# Patient Record
Sex: FEMALE | Race: WHITE | Employment: FULL TIME | URBAN - METROPOLITAN AREA
[De-identification: names, ages, dates, MRNs, and addresses within clinical notes are randomized per-mention and may not be internally consistent; named-entity substitution may affect disease eponyms.]

---

## 2017-08-22 ENCOUNTER — OFFICE VISIT (OUTPATIENT)
Dept: OBGYN CLINIC | Age: 32
End: 2017-08-22

## 2017-08-22 VITALS
SYSTOLIC BLOOD PRESSURE: 101 MMHG | HEIGHT: 62 IN | DIASTOLIC BLOOD PRESSURE: 60 MMHG | HEART RATE: 79 BPM | BODY MASS INDEX: 23.37 KG/M2 | WEIGHT: 127 LBS

## 2017-08-22 DIAGNOSIS — E28.2 PCOS (POLYCYSTIC OVARIAN SYNDROME): ICD-10-CM

## 2017-08-22 DIAGNOSIS — Z01.419 ENCOUNTER FOR WELL WOMAN EXAM WITH ROUTINE GYNECOLOGICAL EXAM: Primary | ICD-10-CM

## 2017-08-22 RX ORDER — OXYBUTYNIN CHLORIDE 5 MG/1
TABLET, EXTENDED RELEASE ORAL
Refills: 2 | COMMUNITY
Start: 2017-07-03 | End: 2022-08-08

## 2017-08-22 NOTE — PROGRESS NOTES
Annual exam ages 21-46    [address] is a ,  29 y.o. female [facility] Patient's last menstrual period was 2017., who presents for her annual checkup. Since her last annual GYN exam about one year ago on 16, she has had the following changes in her health history: Overactive bladder, on Rx - oxybutin. PCOS. Sees endocrinologist in [place]. On Metformin 500mg BID. ADDITIONAL CONCERNS:  She is having no significant problems. With regard to the Gardasil vaccine, she is older than the FDA approved age to receive it. Menstrual status:    Her periods are light in flow. She is using one to two pads or tampons per day, usually regular every 5-6 weeks. She denies dysmenorrhea. She denies premenstrual symptoms. Contraception:    The current method of family planning is condoms most of the time. Sexual history:     She  reports that she currently engages in sexual activity and has had male partners. She reports using the following method of birth control/protection: Condom. .        Pap and Mammogram History:    Her most recent Pap smear was normal, HPV was not indicated, obtained 2 year(s) ago. She does not have a history of abnormal paps. The patient has never had a mammogram.    Breast Cancer History/Substance Abuse: Negative     Past Medical History:   Diagnosis Date    HPV vaccine counseling     Gardasil series completed    Overactive bladder     Sx resolved, off meds    Screening for malignant neoplasm of the cervix 7/14/15    Negative (no hpv)     History reviewed. No pertinent surgical history.   OB History    Para Term  AB Living   0        SAB TAB Ectopic Molar Multiple Live Births                     Current Outpatient Prescriptions   Medication Sig Dispense Refill    metFORMIN (GLUCOPHAGE) 500 mg tablet   5    oxybutynin chloride XL (DITROPAN XL) 5 mg CR tablet TK 1 T PO QHS  2    Biotin 2,500 mcg cap Take  by mouth.      multivitamin (ONE A DAY) tablet Take 1 Tab by mouth daily.  MYRBETRIQ 50 mg ER tablet   3    progesterone (PROMETRIUM) 200 mg capsule 20omg qhs x10n 10 Cap 0    B COMPLEX WITH VITAMIN C (MYBEC PO) Take  by mouth.  HYDROcodone-acetaminophen (VICODIN) 5-500 mg per tablet Take 1 Tab by mouth every six (6) hours as needed for Pain. 20 Tab 0     Allergies: Cefzil [cefprozil] and Erythromycin   Social History     Social History    Marital status:      Spouse name: N/A    Number of children: N/A    Years of education: N/A     Occupational History    Not on file. Social History Main Topics    Smoking status: Never Smoker    Smokeless tobacco: Never Used      Comment: Never used vapor or e-cigs     Alcohol use No    Drug use: No    Sexual activity: Yes     Partners: Male     Birth control/ protection: Condom     Other Topics Concern    Not on file     Social History Narrative     Tobacco History:  reports that she has never smoked. She has never used smokeless tobacco.  Alcohol Abuse:  reports that she does not drink alcohol. Drug Abuse:  reports that she does not use illicit drugs. There is no problem list on file for this patient.     Family History   Problem Relation Age of Onset    Hypertension Mother     Diabetes Father     Hypertension Brother     Diabetes Paternal Grandmother        Review of Systems - History obtained from the patient  Constitutional: negative for weight loss, fever, night sweats  HEENT: negative for hearing loss, earache, congestion, snoring, sorethroat  CV: negative for chest pain, palpitations, edema  Resp: negative for cough, shortness of breath, wheezing  GI: negative for change in bowel habits, abdominal pain, black or bloody stools  : negative for  vaginal discharge  MSK: negative for back pain, joint pain, muscle pain  Breast: negative for breast lumps, nipple discharge, galactorrhea  Skin :negative for itching, rash, hives  Neuro: negative for dizziness, headache, confusion, weakness  Psych: negative for anxiety, depression, change in mood  Heme/lymph: negative for bleeding, bruising, pallor    Physical Exam    Visit Vitals    /60    Pulse 79    Ht 5' 1.5\" (1.562 m)    Wt 127 lb (57.6 kg)    LMP 08/17/2017    BMI 23.61 kg/m2       Constitutional  · Appearance: well-nourished, well developed, alert, in no acute distress    HENT  · Head and Face: appears normal    Neck  · Inspection/Palpation: normal appearance, no masses or tenderness  · Lymph Nodes: no lymphadenopathy present  · Thyroid: gland size normal, nontender, no nodules or masses present on palpation    Chest  · Respiratory Effort: breathing unlabored  · Auscultation: normal breath sounds    Cardiovascular  · Heart:  · Auscultation: regular rate and rhythm without murmur    Breasts  · Inspection of Breasts: breasts symmetrical, no skin changes, no discharge present, nipple appearance normal, no skin retraction present  · Palpation of Breasts and Axillae: no masses present on palpation, no breast tenderness  · Axillary Lymph Nodes: no lymphadenopathy present    Gastrointestinal  · Abdominal Examination: abdomen non-tender to palpation, normal bowel sounds, no masses present  · Liver and spleen: no hepatomegaly present, spleen not palpable  · Hernias: no hernias identified    Genitourinary  · External Genitalia: normal appearance for age, no discharge present, no tenderness present, no inflammatory lesions present, no masses present, no atrophy present  · Vagina: normal vaginal vault without central or paravaginal defects, no discharge present, no inflammatory lesions present, no masses present  · Bladder: non-tender to palpation  · Urethra: appears normal  · Cervix: normal   · Uterus: normal size, shape and consistency  · Adnexa: no adnexal tenderness present, no adnexal masses present  · Perineum: perineum within normal limits, no evidence of trauma, no rashes or skin lesions present  · Anus: anus within normal limits, no hemorrhoids present  · Inguinal Lymph Nodes: no lymphadenopathy present    Skin  · General Inspection: no rash, no lesions identified    Neurologic/Psychiatric  · Mental Status:  · Orientation: grossly oriented to person, place and time  · Mood and Affect: mood normal, affect appropriate        Assessment & Plan:  · Routine gynecologic examination. Pap/HPV  · Preconceptual counseling. Rec MVI/PNV/folate/DHA. Healthy life style, avoid T/E/D. Up to date with vaccinations. Address any dental issues prior to pregnancy. Handouts given. · PCOS. Followed by endo. On Metformin. · OAB. On Oxybutin. · Her current medical status is satisfactory with no evidence of significant gynecologic issues.   · Counseled re: diet, exercise, healthy lifestyle  · Return for yearly wellness visits  · Patient verbalized understanding

## 2017-08-26 LAB
CYTOLOGIST CVX/VAG CYTO: NORMAL
CYTOLOGY CVX/VAG DOC THIN PREP: NORMAL
CYTOLOGY HISTORY:: NORMAL
DX ICD CODE: NORMAL
HPV I/H RISK 1 DNA CVX QL PROBE+SIG AMP: NEGATIVE
Lab: NORMAL
OTHER STN SPEC: NORMAL
PATH REPORT.FINAL DX SPEC: NORMAL
STAT OF ADQ CVX/VAG CYTO-IMP: NORMAL

## 2019-07-15 ENCOUNTER — OFFICE VISIT (OUTPATIENT)
Dept: OBGYN CLINIC | Age: 34
End: 2019-07-15

## 2019-07-15 VITALS
BODY MASS INDEX: 25.76 KG/M2 | WEIGHT: 140 LBS | DIASTOLIC BLOOD PRESSURE: 66 MMHG | HEART RATE: 82 BPM | HEIGHT: 62 IN | SYSTOLIC BLOOD PRESSURE: 101 MMHG

## 2019-07-15 DIAGNOSIS — Z01.419 ENCOUNTER FOR WELL WOMAN EXAM WITH ROUTINE GYNECOLOGICAL EXAM: ICD-10-CM

## 2019-07-15 DIAGNOSIS — E28.2 PCOS (POLYCYSTIC OVARIAN SYNDROME): ICD-10-CM

## 2019-07-15 DIAGNOSIS — N94.6 DYSMENORRHEA: Primary | ICD-10-CM

## 2019-07-15 NOTE — PROGRESS NOTES
Annual exam ages 21-44    [address] is a [de-identified] P5,  30 y.o. female Formerly named [facility] Patient's last menstrual period was 2019 (approximate). , who presents for her annual checkup. LV=17 for AE     in [place]    Since her last annual GYN exam about two years ago on 17, she has had the following changes in her health history: none    - overactive bladder. Has been on oxybutynin. - PCOS. Followed by joshua in [facility], on Metformin. Started PNV after last AE. ADDITIONAL CONCERNS:  She is having no significant problems. With regard to the Gardasil vaccine, she has received all 3 injections. Menstrual status:    Her periods are moderate in flow, lasting 6-7 days, varies d/t PCOS, cycles every 4-6 wks. Since April/May have been every 4wks. She has dysmenorrhea. Bad cramps with cycles. Started in April. Has taken Midol that didn't help. Has taken NSAIDs with some relief. She has some premenstrual symptoms - breast tenderness, smell sensitivity    Contraception:    The current method of family planning is none. Sexual history:     She  reports that she currently engages in sexual activity and has had partners who are Male. She reports using the following method of birth control/protection: None. Pap and Mammogram History:    Her most recent Pap smear was normal, HPV was negative, obtained on 17. She does not have a history of abnormal paps. The patient has never had a mammogram.    Breast Cancer History/Substance Abuse: Negative     Past Medical History:   Diagnosis Date    HPV vaccine counseling     Gardasil series completed    Overactive bladder     PCOS (polycystic ovarian syndrome)     Sees joshua in [facility]. Metformin 500mg BID.  Screening for malignant neoplasm of the cervix 2017    Negative ; HPV Negative      History reviewed. No pertinent surgical history.   OB History    Para Term  AB Living   0 SAB TAB Ectopic Molar Multiple Live Births                     Current Outpatient Medications   Medication Sig Dispense Refill    oxybutynin chloride XL (DITROPAN XL) 5 mg CR tablet TK 1 T PO QHS  2    metFORMIN (GLUCOPHAGE) 500 mg tablet   5    multivitamin (ONE A DAY) tablet Take 1 Tab by mouth daily. Allergies: Cefzil [cefprozil] and Erythromycin   Social History     Socioeconomic History    Marital status:      Spouse name: Not on file    Number of children: Not on file    Years of education: Not on file    Highest education level: Not on file   Occupational History    Not on file   Social Needs    Financial resource strain: Not on file    Food insecurity:     Worry: Not on file     Inability: Not on file    Transportation needs:     Medical: Not on file     Non-medical: Not on file   Tobacco Use    Smoking status: Never Smoker    Smokeless tobacco: Never Used    Tobacco comment: Never used vapor or e-cigs    Substance and Sexual Activity    Alcohol use: No     Alcohol/week: 0.0 oz    Drug use: No    Sexual activity: Yes     Partners: Male     Birth control/protection: None   Lifestyle    Physical activity:     Days per week: Not on file     Minutes per session: Not on file    Stress: Not on file   Relationships    Social connections:     Talks on phone: Not on file     Gets together: Not on file     Attends Voodoo service: Not on file     Active member of club or organization: Not on file     Attends meetings of clubs or organizations: Not on file     Relationship status: Not on file    Intimate partner violence:     Fear of current or ex partner: Not on file     Emotionally abused: Not on file     Physically abused: Not on file     Forced sexual activity: Not on file   Other Topics Concern    Not on file   Social History Narrative    Not on file     Tobacco History:  reports that she has never smoked.  She has never used smokeless tobacco.  Alcohol Abuse:  reports that she does not drink alcohol. Drug Abuse:  reports that she does not use drugs. There is no problem list on file for this patient.     Family History   Problem Relation Age of Onset    Hypertension Mother     Diabetes Father     Hypertension Brother     Diabetes Paternal Grandmother        Review of Systems - History obtained from the patient  Constitutional: negative for weight loss, fever, night sweats  HEENT: negative for hearing loss, earache, congestion, snoring, sorethroat  CV: negative for chest pain, palpitations, edema  Resp: negative for cough, shortness of breath, wheezing  GI: negative for change in bowel habits, abdominal pain, black or bloody stools  : negative for frequency, dysuria, hematuria, vaginal discharge  MSK: negative for back pain, joint pain, muscle pain  Breast: negative for breast lumps, nipple discharge, galactorrhea  Skin :negative for itching, rash, hives  Neuro: negative for dizziness, headache, confusion, weakness  Psych: negative for anxiety, depression, change in mood  Heme/lymph: negative for bleeding, bruising, pallor    Physical Exam    Visit Vitals  /66 (BP 1 Location: Right arm, BP Patient Position: Sitting)   Pulse 82   Ht 5' 1.5\" (1.562 m)   Wt 140 lb (63.5 kg)   LMP 06/20/2019 (Approximate)   BMI 26.02 kg/m²       Constitutional  · Appearance: well-nourished, well developed, alert, in no acute distress    HENT  · Head and Face: appears normal    Neck  · Inspection/Palpation: normal appearance, no masses or tenderness  · Lymph Nodes: no lymphadenopathy present  · Thyroid: gland size normal, nontender, no nodules or masses present on palpation    Chest  · Respiratory Effort: breathing unlabored  · Auscultation: normal breath sounds    Cardiovascular  · Heart:  · Auscultation: regular rate and rhythm without murmur    Breasts  · Inspection of Breasts: breasts symmetrical, no skin changes, no discharge present, nipple appearance normal, no skin retraction present  · Palpation of Breasts and Axillae: no masses present on palpation, no breast tenderness  · Axillary Lymph Nodes: no lymphadenopathy present    Gastrointestinal  · Abdominal Examination: abdomen non-tender to palpation, normal bowel sounds, no masses present  · Liver and spleen: no hepatomegaly present, spleen not palpable  · Hernias: no hernias identified    Genitourinary  · External Genitalia: normal appearance for age, no discharge present, no tenderness present, no inflammatory lesions present, no masses present, no atrophy present  · Vagina: normal vaginal vault without central or paravaginal defects, no discharge present, no inflammatory lesions present, no masses present  · Bladder: non-tender to palpation  · Urethra: appears normal  · Cervix: normal   · Uterus: normal size, shape and consistency  · Adnexa: no adnexal tenderness present, no adnexal masses present  · Perineum: perineum within normal limits, no evidence of trauma, no rashes or skin lesions present  · Anus: anus within normal limits, no hemorrhoids present  · Inguinal Lymph Nodes: no lymphadenopathy present    Skin  · General Inspection: no rash, no lesions identified    Neurologic/Psychiatric  · Mental Status:  · Orientation: grossly oriented to person, place and time  · Mood and Affect: mood normal, affect appropriate        Assessment & Plan:  · Routine gynecologic examination. Pap/HPV negative 8/22/17 -> q5yrs. · Dysmenorrhea. Significant increase in last few months -> pelvic US. Declines STD swab. · PCOS. On Metformin, followed by endo. · OAB. On Oxybutynin.   · Counseled re: diet, exercise, healthy lifestyle  · Return for yearly wellness visits  · Gardisil completed  · Pt counseled regarding co-testing for high risk HPV with pap  · Patient verbalized understanding

## 2019-07-15 NOTE — PATIENT INSTRUCTIONS

## 2019-08-01 NOTE — PROGRESS NOTES
Ultrasound followup Tammy Becerra is a 30 y.o. female is here today to review the results of her ultrasound evaluation. Her U/S evaluation is performed because of a previous encounter revealing dysmenorrhea and pelvic pain which was identified several months ago. She is here for an initial ultrasound study. The sonogram: ***. See detailed report for more information.

## 2019-08-02 ENCOUNTER — OFFICE VISIT (OUTPATIENT)
Dept: OBGYN CLINIC | Age: 34
End: 2019-08-02

## 2019-08-02 VITALS
BODY MASS INDEX: 25.76 KG/M2 | SYSTOLIC BLOOD PRESSURE: 120 MMHG | WEIGHT: 140 LBS | RESPIRATION RATE: 19 BRPM | HEIGHT: 62 IN | DIASTOLIC BLOOD PRESSURE: 68 MMHG

## 2019-08-02 DIAGNOSIS — D21.9 FIBROIDS: ICD-10-CM

## 2019-08-02 DIAGNOSIS — N94.6 DYSMENORRHEA: Primary | ICD-10-CM

## 2019-08-02 NOTE — PROGRESS NOTES
Ultrasound followup    Fercho Najera is a 30 y.o. female is here today to review the results of her ultrasound evaluation. Her U/S evaluation is performed because of a previous encounter revealing dysmenorrhea which was identified 2 weeks ago. She is here for a(n) initial ultrasound study. The sonogram results are:  TRANSVAGINAL ULTRASOUND PERFORMED  UTERUS IS ANTEVERTED, NORMAL IN SIZE AND ECHOGENICITY. THERE IS A 1.3X1.0X1.3CMS FIBROID OFF THE  POSTERIOR LEFT FUNDUS. ENDOMETRIUM MEASURES 4MM IN THICKNESS. NO EVIDENCE OF MASSES OR ABNORMALITIES ARE SEEN. BOTH OVARIES HAVE MULTIPLE PERIPHERAL FOLLICLES, CONSISTENT WITH PCOS. NO FREE FLUID SEEN IN THE CDS. See detailed report for more information    Reviewed US findings with pt. Does have fibroid, but very small, subserosal, should not be causing any significant sx. H/o irreg cycles d/t PCOS. Cycles have become more regular over the last few months, dysmenorrhea has increased at the same time. Discussed possible that she is now having ovulatory cycles and may have more sx with these compared to anovulatory bleeding. Not interested in OCPs, IUD etc (may try to get pregnant in next year or so). Some relief with Aleve. Will continue. Can premedicate. Total face-to-face time with the patient was 12 minutes, and over half of this time was spent in patient counseling.

## 2020-07-23 NOTE — PROGRESS NOTES
Annual exam ages [age]    [address] is a ,  37 y.o. female [place] Mail No LMP recorded. , who presents for her annual checkup.  in [place]. 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 Thinking about trying to get pregnant within next year or so. LV=19 for dysmenorrhea, fibroids (1.3cm)  AE=7/15/19. Reported increased dysmenorrhea. Returned for [address]    Since her last annual GYN exam 19, she has had the following changes in her health history:   - none    - PCOS. Followed by joshua in [facility], on Metformin. Just had labs drawn, nl per pt.  - OAB . On Oxybutynin. Sees urologist in [facility]. Has stopped taking for now since she isn't travelling as much. ADDITIONAL CONCERNS:  She is having no significant problems. With regard to the Gardasil vaccine, she has not received it yet. Menstrual status:    Her periods are moderate in flow. She is using three to five pads or tampons per day, usually regular but have been off lately due to stress. Sometimes a few days late, up to a week late. She denies dysmenorrhea. She denies premenstrual symptoms. Contraception:    The current method of family planning is none. Sexual history:     She  reports being sexually active and has had partner(s) who are Male. She reports using the following method of birth control/protection: None. .        Pap and Mammogram History:    Her most recent Pap smear was normal, HPV was neg obtained 17. She does not have a history of abnormal paps. The patient had a mammogram 19. Breast Cancer History/Substance Abuse: neg    Past Medical History:   Diagnosis Date    Fibroids     1.3cm on US 2019.  HPV vaccine counseling     Gardasil series completed    Overactive bladder     PCOS (polycystic ovarian syndrome)     Sees joshua in [facility]. Metformin 500mg BID.     Screening for malignant neoplasm of the cervix 2017    Negative ; HPV Negative      History reviewed. No pertinent surgical history. OB History    Para Term  AB Living   0             SAB TAB Ectopic Molar Multiple Live Births                     Current Outpatient Medications   Medication Sig Dispense Refill    oxybutynin chloride XL (DITROPAN XL) 5 mg CR tablet TK 1 T PO QHS  2    metFORMIN (GLUCOPHAGE) 500 mg tablet   5    multivitamin (ONE A DAY) tablet Take 1 Tab by mouth daily.        Allergies: Cefzil [cefprozil] and Erythromycin   Social History     Socioeconomic History    Marital status:      Spouse name: Not on file    Number of children: Not on file    Years of education: Not on file    Highest education level: Not on file   Occupational History    Not on file   Social Needs    Financial resource strain: Not on file    Food insecurity     Worry: Not on file     Inability: Not on file    Transportation needs     Medical: Not on file     Non-medical: Not on file   Tobacco Use    Smoking status: Never Smoker    Smokeless tobacco: Never Used    Tobacco comment: Never used vapor or e-cigs    Substance and Sexual Activity    Alcohol use: No     Alcohol/week: 0.0 standard drinks    Drug use: No    Sexual activity: Yes     Partners: Male     Birth control/protection: None   Lifestyle    Physical activity     Days per week: Not on file     Minutes per session: Not on file    Stress: Not on file   Relationships    Social connections     Talks on phone: Not on file     Gets together: Not on file     Attends Mosque service: Not on file     Active member of club or organization: Not on file     Attends meetings of clubs or organizations: Not on file     Relationship status: Not on file    Intimate partner violence     Fear of current or ex partner: Not on file     Emotionally abused: Not on file     Physically abused: Not on file     Forced sexual activity: Not on file   Other Topics Concern    Not on file   Social History Narrative    Not on file     Tobacco History:  reports that she has never smoked. She has never used smokeless tobacco.  Alcohol Abuse:  reports no history of alcohol use. Drug Abuse:  reports no history of drug use. There is no problem list on file for this patient.     Family History   Problem Relation Age of Onset    Hypertension Mother     Diabetes Father     Hypertension Brother     Diabetes Paternal Grandmother        Review of Systems - History obtained from the patient  Constitutional: negative for weight loss, fever, night sweats  HEENT: negative for hearing loss, earache, congestion, snoring, sorethroat  CV: negative for chest pain, palpitations, edema  Resp: negative for cough, shortness of breath, wheezing  GI: negative for change in bowel habits, abdominal pain, black or bloody stools  : negative for frequency, dysuria, hematuria, vaginal discharge  MSK: negative for back pain, joint pain, muscle pain  Breast: negative for breast lumps, nipple discharge, galactorrhea  Skin :negative for itching, rash, hives  Neuro: negative for dizziness, headache, confusion, weakness  Psych: negative for anxiety, depression, change in mood  Heme/lymph: negative for bleeding, bruising, pallor    Physical Exam    Visit Vitals  /65 (BP 1 Location: Left arm, BP Patient Position: Sitting)   Ht 5' 1.5\" (1.562 m)   Wt 137 lb (62.1 kg)   BMI 25.47 kg/m²       Constitutional  · Appearance: well-nourished, well developed, alert, in no acute distress    HENT  · Head and Face: appears normal    Neck  · Inspection/Palpation: normal appearance, no masses or tenderness  · Lymph Nodes: no lymphadenopathy present  · Thyroid: gland size normal, nontender, no nodules or masses present on palpation    Chest  · Respiratory Effort: breathing unlabored  · Auscultation: normal breath sounds    Cardiovascular  · Heart:  · Auscultation: regular rate and rhythm without murmur    Breasts  · Inspection of Breasts: breasts symmetrical, no skin changes, no discharge present, nipple appearance normal, no skin retraction present  · Palpation of Breasts and Axillae: no masses present on palpation, no breast tenderness  · Axillary Lymph Nodes: no lymphadenopathy present    Gastrointestinal  · Abdominal Examination: abdomen non-tender to palpation, normal bowel sounds, no masses present  · Liver and spleen: no hepatomegaly present, spleen not palpable  · Hernias: no hernias identified    Genitourinary  · External Genitalia: normal appearance for age, no discharge present, no tenderness present, no inflammatory lesions present, no masses present, no atrophy present  · Vagina: normal vaginal vault without central or paravaginal defects, no discharge present, no inflammatory lesions present, no masses present  · Bladder: non-tender to palpation  · Urethra: appears normal  · Cervix: normal   · Uterus: normal size, shape and consistency  · Adnexa: no adnexal tenderness present, no adnexal masses present  · Perineum: perineum within normal limits, no evidence of trauma, no rashes or skin lesions present  · Anus: anus within normal limits, no hemorrhoids present  · Inguinal Lymph Nodes: no lymphadenopathy present    Skin  · General Inspection: no rash, no lesions identified    Neurologic/Psychiatric  · Mental Status:  · Orientation: grossly oriented to person, place and time  · Mood and Affect: mood normal, affect appropriate        Assessment & Plan:  · Routine gynecologic examination. Pap/HPV neg 8/22/17 -> q5yrs  · PCOS. On Metformin, followed by endo. · OAB. On Oxybutynin. · Cycles a little irregular -> TSH, PRL  · Preconceptual counseling. Rec MVI/PNV/folate/DHA. Healthy life style, avoid T/E/D. Up to date with vaccinations. Address any dental issues prior to pregnancy. Discussed Rubella titer (wants drawn today) and carrier screening (pamphlet given). Handouts given.   · Counseled re: diet, exercise, healthy lifestyle  · Return for yearly wellness visits  · Gardisil completed  · Pt counseled regarding co-testing for high risk HPV with pap  · Patient verbalized understanding    Orders Placed This Encounter    TSH 3RD GENERATION    PROLACTIN    RUBELLA AB, IGG

## 2020-07-24 ENCOUNTER — OFFICE VISIT (OUTPATIENT)
Dept: OBGYN CLINIC | Age: 35
End: 2020-07-24

## 2020-07-24 VITALS
SYSTOLIC BLOOD PRESSURE: 107 MMHG | WEIGHT: 137 LBS | BODY MASS INDEX: 25.21 KG/M2 | DIASTOLIC BLOOD PRESSURE: 65 MMHG | HEIGHT: 62 IN

## 2020-07-24 DIAGNOSIS — Z31.69 ENCOUNTER FOR PRECONCEPTION CONSULTATION: ICD-10-CM

## 2020-07-24 DIAGNOSIS — N92.6 IRREGULAR MENSTRUAL CYCLE: ICD-10-CM

## 2020-07-24 DIAGNOSIS — Z01.419 ENCOUNTER FOR GYNECOLOGICAL EXAMINATION: Primary | ICD-10-CM

## 2020-07-25 LAB
PROLACTIN SERPL-MCNC: 8.2 NG/ML (ref 4.8–23.3)
RUBV IGG SERPL IA-ACNC: 1.6 INDEX
TSH SERPL DL<=0.005 MIU/L-ACNC: 0.99 UIU/ML (ref 0.45–4.5)

## 2020-08-05 ENCOUNTER — TELEPHONE (OUTPATIENT)
Dept: OBGYN CLINIC | Age: 35
End: 2020-08-05

## 2021-08-03 NOTE — PROGRESS NOTES
Annual exam ages [age]    Mariajose Marti is a ,  31 y.o. female [address] [facility] Patient's last menstrual period was 2021 (approximate). , who presents for her annual checkup. Since her last annual GYN exam about one year ago, she has had the following changes in her health history:   - got covid vaccines    ADDITIONAL CONCERNS:  She is having some vaginal itching. No new discharge, does have some discharge around ovulation. No odor. Itching is perineal.  Itching usually early or later in the day. Uses feminine wash occasionally, sx seem to subside when she uses that. With regard to the Gardasil vaccine, she has received all 3 injections. Menstrual status:    Her periods are moderate in flow. She is using three to five pads or tampons per day, usually regular lasting 30-40 days. Fairly regular, usually every 5-6 weeks. She has dysmenorrhea. Usually just on heavier days. Takes Aleve which helps. Also uses essential oils. Has tried rassberry leaf tea, helps if starts a couple of days before her period. She denies premenstrual symptoms. Contraception:    The current method of family planning is none. Sexual history:     She  reports being sexually active and has had partner(s) who are Male. She reports using the following method of birth control/protection: None. . Wants to start trying to get pregnant. Pap and Mammogram History:    Her most recent Pap smear was normal, HPV was neg, obtained 2017. She does not have a history of abnormal paps. The patient has never had a mammogram.    Breast Cancer History/Substance Abuse:    Past Medical History:   Diagnosis Date    Fibroids     1.3cm on US 2019.  HPV vaccine counseling 2013    Gardasil series completed    Overactive bladder     PCOS (polycystic ovarian syndrome)     Sees endo in [facility]. Metformin 500mg BID.     Screening for malignant neoplasm of the cervix 2017    Negative ; HPV Negative History reviewed. No pertinent surgical history. OB History    Para Term  AB Living   0             SAB TAB Ectopic Molar Multiple Live Births                     Current Outpatient Medications   Medication Sig Dispense Refill    loratadine (CLARITIN) 10 mg tablet Take 10 mg by mouth daily as needed.  elderberry fruit (ELDERBERRY PO) Take  by mouth.  oxybutynin chloride XL (DITROPAN XL) 5 mg CR tablet TK 1 T PO QHS  2    metFORMIN (GLUCOPHAGE) 500 mg tablet   5    multivitamin (ONE A DAY) tablet Take 1 Tab by mouth daily. Allergies: Cefzil [cefprozil] and Erythromycin   Social History     Socioeconomic History    Marital status:      Spouse name: Not on file    Number of children: Not on file    Years of education: Not on file    Highest education level: Not on file   Occupational History    Not on file   Tobacco Use    Smoking status: Never Smoker    Smokeless tobacco: Never Used    Tobacco comment: Never used vapor or e-cigs    Substance and Sexual Activity    Alcohol use: No     Alcohol/week: 0.0 standard drinks    Drug use: No    Sexual activity: Yes     Partners: Male     Birth control/protection: None   Other Topics Concern    Not on file   Social History Narrative    Not on file     Social Determinants of Health     Financial Resource Strain:     Difficulty of Paying Living Expenses:    Food Insecurity:     Worried About Running Out of Food in the Last Year:     [address] in the Last Year:    Transportation Needs:     Lack of Transportation (Medical):      Lack of Transportation (Non-Medical):    Physical Activity:     Days of Exercise per Week:     Minutes of Exercise per Session:    Stress:     Feeling of Stress :    Social Connections:     Frequency of Communication with Friends and Family:     Frequency of Social Gatherings with Friends and Family:     Attends Buddhist Services:     Active Member of Clubs or Organizations:     Attends Club or Organization Meetings:     Marital Status:    Intimate Partner Violence:     Fear of Current or Ex-Partner:     Emotionally Abused:     Physically Abused:     Sexually Abused: Tobacco History:  reports that she has never smoked. She has never used smokeless tobacco.  Alcohol Abuse:  reports no history of alcohol use. Drug Abuse:  reports no history of drug use. There is no problem list on file for this patient.     Family History   Problem Relation Age of Onset    Hypertension Mother     Diabetes Father     Hypertension Brother     Diabetes Paternal Grandmother        Review of Systems - History obtained from the patient  Constitutional: negative for weight loss, fever, night sweats  HEENT: negative for hearing loss, earache, congestion, snoring, sorethroat  CV: negative for chest pain, palpitations, edema  Resp: negative for cough, shortness of breath, wheezing  GI: negative for change in bowel habits, abdominal pain, black or bloody stools  : negative for frequency, dysuria, hematuria, vaginal discharge  MSK: negative for back pain, joint pain, muscle pain  Breast: negative for breast lumps, nipple discharge, galactorrhea  Skin :negative for itching, rash, hives  Neuro: negative for dizziness, headache, confusion, weakness  Psych: negative for anxiety, depression, change in mood  Heme/lymph: negative for bleeding, bruising, pallor    Physical Exam    Visit Vitals  /64   Pulse 74   Wt 138 lb (62.6 kg)   LMP 07/02/2021 (Approximate)   BMI 25.65 kg/m²       Constitutional  · Appearance: well-nourished, well developed, alert, in no acute distress    HENT  · Head and Face: appears normal    Neck  · Inspection/Palpation: normal appearance, no masses or tenderness  · Lymph Nodes: no lymphadenopathy present  · Thyroid: gland size normal, nontender, no nodules or masses present on palpation    Chest  · Respiratory Effort: breathing unlabored  · Auscultation: normal breath sounds    Cardiovascular  · Heart:  · Auscultation: regular rate and rhythm without murmur    Breasts  · Inspection of Breasts: breasts symmetrical, no skin changes, no discharge present, nipple appearance normal, no skin retraction present  · Palpation of Breasts and Axillae: no masses present on palpation, no breast tenderness  · Axillary Lymph Nodes: no lymphadenopathy present    Gastrointestinal  · Abdominal Examination: abdomen non-tender to palpation, normal bowel sounds, no masses present  · Liver and spleen: no hepatomegaly present, spleen not palpable  · Hernias: no hernias identified    Genitourinary  · External Genitalia: normal appearance for age, no discharge present, no tenderness present, no inflammatory lesions present, no masses present, no atrophy present  · Vagina: normal vaginal vault without central or paravaginal defects, scant white discharge present, no inflammatory lesions present, no masses present  · Bladder: non-tender to palpation  · Urethra: appears normal  · Cervix: normal   · Uterus: normal size, shape and consistency  · Adnexa: no adnexal tenderness present, no adnexal masses present  · Perineum: perineum within normal limits, no evidence of trauma, no rashes or skin lesions present  · Anus: anus within normal limits, no hemorrhoids present  · Inguinal Lymph Nodes: no lymphadenopathy present    Skin  · General Inspection: no rash, no lesions identified    Neurologic/Psychiatric  · Mental Status:  · Orientation: grossly oriented to person, place and time  · Mood and Affect: mood normal, affect appropriate        Assessment & Plan:  · Routine gynecologic examination. Pap/HPV neg 8/2017 -> due next year  · Vulvovaginal itching. Scant discharge on exam, otherwise nl exam.  Nuswab BV/yeast.  · Counseled re: diet, exercise, healthy lifestyle  · Return for yearly wellness visits  · Preconceptual counseling. Rec MVI/PNV/folate/DHA. Healthy life style, avoid T/E/D.   Up to date with vaccinations. Address any dental issues prior to pregnancy. · PCOS. On Metformin. Cycles regular every 5-6wks. Cervical mucous changes with ovulation. Discussed OPK. RTO if interested in Femara. Consider ERYN if not pregnant within 9-12 months. · Will be 36yo 6/2022. Discussed decreased fertility, increased risk of aneuploidy.   · Pt counseled regarding co-testing for high risk HPV with pap  · Rec screening mammo  · Patient verbalized understanding      Orders Placed This Encounter    NUSWAB VAGINOSIS + CANDIDA

## 2021-08-04 ENCOUNTER — OFFICE VISIT (OUTPATIENT)
Dept: OBGYN CLINIC | Age: 36
End: 2021-08-04
Payer: COMMERCIAL

## 2021-08-04 VITALS
BODY MASS INDEX: 25.65 KG/M2 | SYSTOLIC BLOOD PRESSURE: 110 MMHG | DIASTOLIC BLOOD PRESSURE: 64 MMHG | WEIGHT: 138 LBS | HEART RATE: 74 BPM

## 2021-08-04 DIAGNOSIS — Z01.419 ENCOUNTER FOR GYNECOLOGICAL EXAMINATION: Primary | ICD-10-CM

## 2021-08-04 DIAGNOSIS — E28.2 PCOS (POLYCYSTIC OVARIAN SYNDROME): ICD-10-CM

## 2021-08-04 DIAGNOSIS — N89.8 VAGINAL ITCHING: ICD-10-CM

## 2021-08-04 PROCEDURE — 99395 PREV VISIT EST AGE 18-39: CPT | Performed by: OBSTETRICS & GYNECOLOGY

## 2021-08-04 RX ORDER — LORATADINE 10 MG/1
10 TABLET ORAL DAILY PRN
COMMUNITY

## 2021-08-06 LAB
A VAGINAE DNA VAG QL NAA+PROBE: NORMAL SCORE
BVAB2 DNA VAG QL NAA+PROBE: NORMAL SCORE
C ALBICANS DNA VAG QL NAA+PROBE: NEGATIVE
C GLABRATA DNA VAG QL NAA+PROBE: NEGATIVE
MEGA1 DNA VAG QL NAA+PROBE: NORMAL SCORE
SPECIMEN STATUS REPORT, ROLRST: NORMAL

## 2022-08-02 NOTE — PROGRESS NOTES
Annual exam ages 21-44    Ranjith Jeronimo is a [de-identified] P5,  30 y.o. female [address] [facility] Patient's last menstrual period was 2022., who presents for her annual checkup. LV=21 AE    Since her last annual GYN exam about one year ago, she has had the following changes in her health history:   - none    ADDITIONAL CONCERNS:  She is having no significant problems. With regard to the Gardasil vaccine, she has received all 3 injections. Menstrual status:    Her periods are moderate in flow. She is using three to five pads or tampons per day, cycles usually every 5-6 weeks. Does have cervical mucous changes c/w ovulation. Used OPK for the first time this month. Did show [address] surge yesterday.  does have to travel for work. She has dysmenorrhea. She denies premenstrual symptoms. Contraception:    The current method of family planning is none. Sexual history:     She  reports being sexually active and has had partner(s) who are male. She reports using the following method of birth control/protection: None. .        Pap and Mammogram History:    Her most recent Pap smear was normal, HPV was neg, obtained . She does not have a history of abnormal paps. The patient has never had a mammogram.    Breast Cancer History/Substance Abuse:    Past Medical History:   Diagnosis Date    Fibroids     1.3cm on US 2019. HPV vaccine counseling     Gardasil series completed    Overactive bladder     PCOS (polycystic ovarian syndrome)     Sees endo in [facility]. Metformin 500mg BID. Screening for malignant neoplasm of the cervix 2017    Negative ; HPV Negative      No past surgical history on file. OB History    Para Term  AB Living   0             SAB IAB Ectopic Molar Multiple Live Births                     Current Outpatient Medications   Medication Sig Dispense Refill    loratadine (CLARITIN) 10 mg tablet Take 10 mg by mouth daily as needed.       elderberry fruit (ELDERBERRY PO) Take  by mouth.      metFORMIN (GLUCOPHAGE) 500 mg tablet   5    multivitamin (ONE A DAY) tablet Take 1 Tab by mouth daily. oxybutynin chloride XL (DITROPAN XL) 5 mg CR tablet TK 1 T PO QHS (Patient not taking: Reported on 8/8/2022)  2     Allergies: Cefzil [cefprozil] and Erythromycin   Social History     Socioeconomic History    Marital status:      Spouse name: Not on file    Number of children: Not on file    Years of education: Not on file    Highest education level: Not on file   Occupational History    Not on file   Tobacco Use    Smoking status: Never    Smokeless tobacco: Never    Tobacco comments:     Never used vapor or e-cigs    Vaping Use    Vaping Use: Never used   Substance and Sexual Activity    Alcohol use: No     Alcohol/week: 0.0 standard drinks    Drug use: No    Sexual activity: Yes     Partners: Male     Birth control/protection: None   Other Topics Concern    Not on file   Social History Narrative    Not on file     Social Determinants of Health     Financial Resource Strain: Not on file   Food Insecurity: Not on file   Transportation Needs: Not on file   Physical Activity: Not on file   Stress: Not on file   Social Connections: Not on file   Intimate Partner Violence: Not on file   Housing Stability: Not on file     Tobacco History:  reports that she has never smoked. She has never used smokeless tobacco.  Alcohol Abuse:  reports no history of alcohol use. Drug Abuse:  reports no history of drug use. There is no problem list on file for this patient.     Family History   Problem Relation Age of Onset    Hypertension Mother     Diabetes Father     Hypertension Brother     Diabetes Paternal Grandmother        Review of Systems - History obtained from the patient  Constitutional: negative for weight loss, fever, night sweats  HEENT: negative for hearing loss, earache, congestion, snoring, sorethroat  CV: negative for chest pain, palpitations, edema  Resp: negative for cough, shortness of breath, wheezing  GI: negative for change in bowel habits, abdominal pain, black or bloody stools  : negative for frequency, dysuria, hematuria, vaginal discharge  MSK: negative for back pain, joint pain, muscle pain  Breast: negative for breast lumps, nipple discharge, galactorrhea  Skin :negative for itching, rash, hives  Neuro: negative for dizziness, headache, confusion, weakness  Psych: negative for anxiety, depression, change in mood  Heme/lymph: negative for bleeding, bruising, pallor    Physical Exam    Visit Vitals  /80   Pulse 82   Ht 5' 1.5\" (1.562 m)   Wt 140 lb (63.5 kg)   LMP 07/14/2022   BMI 26.02 kg/m²       Constitutional  Appearance: well-nourished, well developed, alert, in no acute distress    HENT  Head and Face: appears normal    Neck  Inspection/Palpation: normal appearance, no masses or tenderness  Lymph Nodes: no lymphadenopathy present  Thyroid: gland size normal, nontender, no nodules or masses present on palpation    Chest  Respiratory Effort: breathing unlabored  Auscultation: normal breath sounds    Cardiovascular  Heart:   Auscultation: regular rate and rhythm without murmur    Breasts  Inspection of Breasts: breasts symmetrical, no skin changes, no discharge present, nipple appearance normal, no skin retraction present  Palpation of Breasts and Axillae: no masses present on palpation, no breast tenderness  Axillary Lymph Nodes: no lymphadenopathy present    Gastrointestinal  Abdominal Examination: abdomen non-tender to palpation, normal bowel sounds, no masses present  Liver and spleen: no hepatomegaly present, spleen not palpable  Hernias: no hernias identified    Genitourinary  External Genitalia: normal appearance for age, no discharge present, no tenderness present, no inflammatory lesions present, no masses present, no atrophy present  Vagina: normal vaginal vault without central or paravaginal defects, no discharge present, no inflammatory lesions present, no masses present  Bladder: non-tender to palpation  Urethra: appears normal  Cervix: normal with some clear mucous at os c/w ovulation  Uterus: normal size, shape and consistency  Adnexa: no adnexal tenderness present, no adnexal masses present  Perineum: perineum within normal limits, no evidence of trauma, no rashes or skin lesions present  Anus: anus within normal limits, no hemorrhoids present  Inguinal Lymph Nodes: no lymphadenopathy present    Skin  General Inspection: no rash, no lesions identified    Neurologic/Psychiatric  Mental Status:  Orientation: grossly oriented to person, place and time  Mood and Affect: mood normal, affect appropriate        Assessment & Plan:  Routine gynecologic examination. Pap/HPV today  Her current medical status is satisfactory with no evidence of significant gynecologic issues. PCOS. Cycles every 5-6wks. Trying for pregnancy. Taking PNV, metformin. OPK c/w ovulation, LH surge yesterday. Can draw progesterone level next week. Counseled re: diet, exercise, healthy lifestyle  Return for yearly wellness visits  Pt counseled regarding co-testing for high risk HPV with pap  Patient verbalized understanding        Orders Placed This Encounter    PAP IG, APTIMA HPV AND RFX 52/49,45 ([id number])     Order Specific Question:   Pap Source? Answer:   Cervical and Endocervical     Order Specific Question:   Total Hysterectomy? Answer:   No     Order Specific Question:   Supracervical Hysterectomy? Answer:   No     Order Specific Question:   Post Menopausal?     Answer:   No     Order Specific Question:   Hormone Therapy? Answer:   No     Order Specific Question:   IUD? Answer:   No     Order Specific Question:   Abnormal Bleeding? Answer:   No     Order Specific Question:   Pregnant     Answer:   No     Order Specific Question:   Post Partum? Answer:    No

## 2022-08-08 ENCOUNTER — OFFICE VISIT (OUTPATIENT)
Dept: OBGYN CLINIC | Age: 37
End: 2022-08-08
Payer: COMMERCIAL

## 2022-08-08 VITALS
WEIGHT: 140 LBS | HEART RATE: 82 BPM | HEIGHT: 62 IN | DIASTOLIC BLOOD PRESSURE: 80 MMHG | SYSTOLIC BLOOD PRESSURE: 117 MMHG | BODY MASS INDEX: 25.76 KG/M2

## 2022-08-08 DIAGNOSIS — Z12.4 PAP SMEAR FOR CERVICAL CANCER SCREENING: ICD-10-CM

## 2022-08-08 DIAGNOSIS — Z01.419 ENCOUNTER FOR WELL WOMAN EXAM: Primary | ICD-10-CM

## 2022-08-08 PROCEDURE — 99395 PREV VISIT EST AGE 18-39: CPT | Performed by: OBSTETRICS & GYNECOLOGY

## 2022-08-11 LAB
CYTOLOGIST CVX/VAG CYTO: NORMAL
CYTOLOGY CVX/VAG DOC CYTO: NORMAL
CYTOLOGY CVX/VAG DOC THIN PREP: NORMAL
CYTOLOGY HISTORY:: NORMAL
DX ICD CODE: NORMAL
HPV I/H RISK 4 DNA CVX QL PROBE+SIG AMP: NEGATIVE
Lab: NORMAL
OTHER STN SPEC: NORMAL
STAT OF ADQ CVX/VAG CYTO-IMP: NORMAL

## 2023-08-08 ENCOUNTER — OFFICE VISIT (OUTPATIENT)
Age: 38
End: 2023-08-08
Payer: COMMERCIAL

## 2023-08-08 VITALS — SYSTOLIC BLOOD PRESSURE: 113 MMHG | BODY MASS INDEX: 26.77 KG/M2 | DIASTOLIC BLOOD PRESSURE: 80 MMHG | WEIGHT: 144 LBS

## 2023-08-08 DIAGNOSIS — Z01.419 ENCOUNTER FOR GYNECOLOGICAL EXAMINATION WITHOUT ABNORMAL FINDING: Primary | ICD-10-CM

## 2023-08-08 PROCEDURE — 99395 PREV VISIT EST AGE 18-39: CPT | Performed by: OBSTETRICS & GYNECOLOGY

## 2023-08-08 NOTE — PROGRESS NOTES
Per Nursing Note:  Andreia Romo is a 41 y.o. female returns for an annual exam          Chief Complaint   Patient presents with    Annual Exam         Patient's last menstrual period was 07/12/2023 (exact date). Her periods are heavy in flow and usually regular with a 26-32 day interval with 3-7 day duration. She has dysmenorrhea. Problems: she and  have been trying to conceive, ovulated about 1 week ago, every since ovulation she has been having mild cramping and blood after intercourse. She states that's not normal for her and would like to discuss. Birth Control: none. Last Pap: normal obtained 1 year(s) ago on 8/8/22. She does not have a history of KESHA 2, 3 or cervical cancer. With regard to the Gardisil vaccine, she has received all 3 injections. Annual exam      Andreia Romo is a No obstetric history on file. ,  41 y.o. female   Patient's last menstrual period was 07/12/2023 (exact date). LV=8/8/22 AE    She presents for her annual checkup. Trying to conceive. Has not seen ROSE MARY      She is  having gyn problems. Since she ovulated this month (about a week ago), has had some postcoital spotting and lower abdominal cramping. \"Not unbearable\", \"not like menstrual cramps\". Sx started with last IC a week ago. Spotting has subsided. Still having cramping, tightness, random. PCOS. On metformin 500mg BID per endo    Menstrual status:    Periods regular  Every month  Duration: 6d  Flow: heavy bleeding middle of her cycle. Dysmenorrhea: Yes  She doeshave premenstrual symptoms. Breast tenderness; heightened smell    FHx endometriosis    Contraception:    The current method of family planning is none. Trying to conceive. Is taking PNV. Dental work UTD. Sexual history:    She  reports being sexually active and has had partner(s) who are male. She reports using the following method of birth control/protection: None.       Pap Smear:  Her most recent Pap smear was normal

## 2023-08-08 NOTE — PROGRESS NOTES
Andreia Romo is a 41 y.o. female returns for an annual exam     Chief Complaint   Patient presents with    Annual Exam       Patient's last menstrual period was 07/12/2023 (exact date). Her periods are heavy in flow and usually regular with a 26-32 day interval with 3-7 day duration. She has dysmenorrhea. Problems: she and  have been trying to conceive, ovulated about 1 week ago, every since ovulation she has been having mild cramping and blood after intercourse. She states that's not normal for her and would like to discuss. Birth Control: none. Last Pap: normal obtained 1 year(s) ago on 8/8/22. She does not have a history of KESHA 2, 3 or cervical cancer. With regard to the Gardisil vaccine, she has received all 3 injections. 1. Have you been to the ER, urgent care clinic, or hospitalized since your last visit? No    2. Have you seen or consulted any other health care providers outside of the [address] [place] [address] since your last visit?  Yes

## 2024-05-28 ENCOUNTER — TELEPHONE (OUTPATIENT)
Age: 39
End: 2024-05-28

## 2024-05-28 ENCOUNTER — OFFICE VISIT (OUTPATIENT)
Age: 39
End: 2024-05-28
Payer: COMMERCIAL

## 2024-05-28 VITALS
BODY MASS INDEX: 26.95 KG/M2 | WEIGHT: 145 LBS | SYSTOLIC BLOOD PRESSURE: 131 MMHG | DIASTOLIC BLOOD PRESSURE: 79 MMHG | HEART RATE: 83 BPM

## 2024-05-28 DIAGNOSIS — O20.0 ABORTION, SPONTANEOUS THREATENED: Primary | ICD-10-CM

## 2024-05-28 DIAGNOSIS — O03.9 SAB (SPONTANEOUS ABORTION): Primary | ICD-10-CM

## 2024-05-28 PROCEDURE — 99213 OFFICE O/P EST LOW 20 MIN: CPT | Performed by: OBSTETRICS & GYNECOLOGY

## 2024-05-28 NOTE — TELEPHONE ENCOUNTER
PT name and  verified    39 yo last ov 23, next ov was eob 24  LMP? PT thinks maybe 3/29/24(8w1d) on Sat  when she passed sac/tissue      PT calling had spoken with scheduling and had orders placed for her beta hcg to get done in [place], PT explains she thinks she miscarried on Sat , she started bleeding and having abdominal cramping.   PT states she passed tissue/balloon type of sac and has kept it on ice. PT states she still is bleeding but has been lighter since the passing of the tissue/sac.  PT states she wanted to know if she needed to keep the sac/tissue for any testing.  Consulted  and advises if PT wants genetic testing done she should keep it on ice and will have to come to the office.  Relayed MD message and PT states she will discuss it with her  and while on phone with RN, PT and  decide they do want the testing and want to drive here today if there is an ov.  Consulted [facility]F/[facility] and was offered 2 pm ov and PT was placed on schedule for ov today at 2 pm.  PT verbalizes understanding.

## 2024-05-28 NOTE — PROGRESS NOTES
Nicki Danielson is a 38 y.o. female presents for a problem visit.    Chief Complaint   Patient presents with    Vaginal Bleeding    Threatened Miscarriage     Patient's last menstrual period was 03/29/2024.  Birth Control: none.  Last Pap: normal obtained 8/22/24    The patient is reporting having: positive pregnancy test after LMP 3/29  vaginal spotting since May 10th.  Wednesday 22nd it started with thicker brown and this past Saturday morning, she passed some tissue.   She reports she is still spotting some.

## 2024-05-28 NOTE — PROGRESS NOTES
Per Nursing Note:     Nicki Danielson is a 38 y.o. female presents for a problem visit.         Chief Complaint   Patient presents with    Vaginal Bleeding    Threatened Miscarriage      Patient's last menstrual period was 2024.  Birth Control: none.  Last Pap: normal obtained 24     The patient is reporting having: positive pregnancy test after LMP 3/29  vaginal spotting since May 10th.   it started with thicker brown and this past Saturday morning, she passed some tissue.   She reports she is still spotting some.              OB/GYN Problem Visit        Chief Complaint   Patient presents with    Vaginal Bleeding    Threatened Miscarriage       HPI  Nicki Danielson is a No obstetric history on file.,  38 y.o. female who presents for a problem visit.   Patient's last menstrual period was 2024.    LV=23 AE    At her last visit, she reported that she had been trying to get pregnant for over a year.  She does have PCOS, on metformin 500 mg twice daily.  She reported regular monthly cycles.      Had some spotting 5/10/24, around the time she would have expected her cycle. Spotting stopped, then bleeding started again last week.  Had heavier bleeding, significant cramping and passage of tissue on Sat.  Since then bleeding has decreased significantly, cramping improving. Taking Tylenol 1000mg q8hrs.    Brings tissue in to offc today.      Past Medical History:   Diagnosis Date    Fibroids     1.3cm on US 2019.    HPV vaccine counseling     Gardasil series completed    Overactive bladder     PCOS (polycystic ovarian syndrome)     Sees jack in [facility].  Metformin 500mg BID.    Screening for malignant neoplasm of the cervix 2022    Negative ; HPV Negative      History reviewed. No pertinent surgical history.  OB History    Para Term  AB Living   0             SAB IAB Ectopic Molar Multiple Live Births                     Social History     Occupational History

## 2024-05-29 LAB
ABO GROUP BLD: NORMAL
RH BLD: POSITIVE

## 2024-05-30 LAB — HCG INTACT+B SERPL-ACNC: 207 MIU/ML

## 2024-05-31 DIAGNOSIS — O03.9 MISCARRIAGE: Primary | ICD-10-CM

## 2024-05-31 LAB — HCG INTACT+B SERPL-ACNC: 123 MIU/ML

## 2024-06-06 LAB
Lab: NORMAL
NTRA AMT RESULT 1: NORMAL

## 2024-06-10 LAB
Lab: NORMAL
NTRA AMT RESULT 1: NORMAL

## 2024-06-12 LAB — HCG INTACT+B SERPL-ACNC: 3 MIU/ML

## 2024-06-14 DIAGNOSIS — O03.9 MISCARRIAGE: ICD-10-CM

## 2024-08-09 ENCOUNTER — OFFICE VISIT (OUTPATIENT)
Age: 39
End: 2024-08-09
Payer: COMMERCIAL

## 2024-08-09 VITALS — DIASTOLIC BLOOD PRESSURE: 76 MMHG | WEIGHT: 144 LBS | SYSTOLIC BLOOD PRESSURE: 118 MMHG | BODY MASS INDEX: 26.77 KG/M2

## 2024-08-09 DIAGNOSIS — E28.2 PCOS (POLYCYSTIC OVARIAN SYNDROME): ICD-10-CM

## 2024-08-09 DIAGNOSIS — Z01.419 ENCOUNTER FOR WELL WOMAN EXAM: Primary | ICD-10-CM

## 2024-08-09 PROCEDURE — 99395 PREV VISIT EST AGE 18-39: CPT | Performed by: OBSTETRICS & GYNECOLOGY

## 2024-08-09 NOTE — PROGRESS NOTES
Nicki Danielson is a 39 y.o. female returns for an annual exam     Chief Complaint   Patient presents with    Annual Exam       Patient's last menstrual period was 07/07/2024.  Her periods are moderate in flow and usually regular with a 26-32 day interval with 3-7 day duration.  She does not have dysmenorrhea.  Problems: no problems  Birth Control: none.  Last Pap: 8/8/2022 and was normal  She does not have a history of KESHA 2, 3 or cervical cancer.         1. Have you been to the ER, urgent care clinic, or hospitalized since your last visit? No    2. Have you seen or consulted any other health care providers outside of the [facility] since your last visit? No      
bloody stools  : negative for frequency, dysuria, hematuria, vaginal discharge  MSK: negative for back pain, joint pain, muscle pain  Breast: negative for breast lumps, nipple discharge, galactorrhea  Skin :negative for itching, rash, hives  Neuro: negative for dizziness, headache, confusion, weakness  Psych: negative for anxiety, depression, change in mood  Heme/lymph: negative for bleeding, bruising, pallor    Physical Exam    /76   Wt 65.3 kg (144 lb)   LMP 07/07/2024   Breastfeeding No   BMI 26.77 kg/m²     Constitutional  Appearance: well-nourished, well developed, alert, in no acute distress    HENT  Head and Face: appears normal    Neck  Inspection/Palpation: normal appearance, no masses or tenderness  Lymph Nodes: no lymphadenopathy present  Thyroid: gland size normal, nontender, no nodules or masses present on palpation    Chest  Respiratory Effort: breathing unlabored  Auscultation: normal breath sounds    Cardiovascular  Heart:  Auscultation: regular rate and rhythm without murmur    Breasts  Inspection of Breasts: breasts symmetrical, no skin changes, no discharge present, nipple appearance normal, no skin retraction present  Palpation of Breasts and Axillae: no masses present on palpation, no breast tenderness  Axillary Lymph Nodes: no lymphadenopathy present    Gastrointestinal  Abdominal Examination: abdomen non-tender to palpation, normal bowel sounds, no masses present  Liver and spleen: no hepatomegaly present, spleen not palpable  Hernias: no hernias identified    Genitourinary  External Genitalia: normal appearance for age, no discharge present, no tenderness present, no inflammatory lesions present, no masses present, no atrophy present  Vagina: normal vaginal vault, no discharge present, no inflammatory lesions present, no masses present  Bladder: non-tender to palpation  Urethra: appears normal  Cervix: normal   Uterus: normal size, shape and consistency; NT; anteverted  Adnexa: no